# Patient Record
Sex: FEMALE | Race: WHITE | Employment: STUDENT | URBAN - METROPOLITAN AREA
[De-identification: names, ages, dates, MRNs, and addresses within clinical notes are randomized per-mention and may not be internally consistent; named-entity substitution may affect disease eponyms.]

---
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
## 2025-02-08 ENCOUNTER — HOSPITAL ENCOUNTER (OUTPATIENT)
Age: 20
Discharge: HOME | End: 2025-02-08
Payer: COMMERCIAL

## 2025-02-08 ENCOUNTER — APPOINTMENT (OUTPATIENT)
Dept: GENERAL RADIOLOGY | Age: 20
End: 2025-02-08
Attending: NURSE PRACTITIONER
Payer: COMMERCIAL

## 2025-02-08 VITALS
DIASTOLIC BLOOD PRESSURE: 74 MMHG | TEMPERATURE: 99 F | RESPIRATION RATE: 16 BRPM | HEART RATE: 88 BPM | OXYGEN SATURATION: 97 % | SYSTOLIC BLOOD PRESSURE: 110 MMHG

## 2025-02-08 DIAGNOSIS — J06.9 VIRAL UPPER RESPIRATORY TRACT INFECTION: ICD-10-CM

## 2025-02-08 DIAGNOSIS — R05.1 ACUTE COUGH: Primary | ICD-10-CM

## 2025-02-08 PROCEDURE — 71046 X-RAY EXAM CHEST 2 VIEWS: CPT | Performed by: NURSE PRACTITIONER

## 2025-02-08 PROCEDURE — 99214 OFFICE O/P EST MOD 30 MIN: CPT | Performed by: NURSE PRACTITIONER

## 2025-02-08 RX ORDER — METHYLPREDNISOLONE 4 MG/1
TABLET ORAL
Qty: 1 EACH | Refills: 0 | Status: SHIPPED | OUTPATIENT
Start: 2025-02-08

## 2025-02-08 RX ORDER — VALACYCLOVIR HYDROCHLORIDE 1 G/1
1 TABLET, FILM COATED ORAL EVERY 12 HOURS
COMMUNITY
Start: 2025-02-07 | End: 2025-02-14

## 2025-02-08 RX ORDER — ALBUTEROL SULFATE 90 UG/1
2 INHALANT RESPIRATORY (INHALATION) EVERY 4 HOURS PRN
Qty: 1 EACH | Refills: 0 | Status: SHIPPED | OUTPATIENT
Start: 2025-02-08 | End: 2025-03-10

## 2025-02-08 NOTE — ED INITIAL ASSESSMENT (HPI)
Patient here with c/o cough since Tuesday. Also reports had body aches and fever but has not had it again since Thursday.

## 2025-02-08 NOTE — ED PROVIDER NOTES
Patient Seen in: [facility]      History     Chief Complaint   Patient presents with    Cough     Stated Complaint: cough; fever    Subjective:   HPI    19 year old female presents today with her mother with c/o cough, body aches and fever intermittently since Tue. Pt states everything except the cough is gone now but she is having some SOB with the cough.       Objective:     Past Medical History:    Sepsis (HCC)              Past Surgical History:   Procedure Laterality Date    Removal adenoids,primary,<13 y/o      Tonsillectomy  2010                No pertinent social history.            Review of Systems   Constitutional:  Positive for fever.   HENT: Negative.     Eyes: Negative.    Respiratory:  Positive for cough and shortness of breath.    Cardiovascular: Negative.    Gastrointestinal: Negative.    Endocrine: Negative.    Genitourinary: Negative.    Musculoskeletal:  Positive for myalgias.   Skin: Negative.    Allergic/Immunologic: Negative.    Neurological: Negative.    Hematological: Negative.    Psychiatric/Behavioral: Negative.         Positive for stated complaint: cough; fever  Other systems are as noted in HPI.  Constitutional and vital signs reviewed.      All other systems reviewed and negative except as noted above.    Physical Exam     ED Triage Vitals [02/08/25 1247]   /74   Pulse 88   Resp 16   Temp 98.5 °F (36.9 °C)   Temp src Oral   SpO2 97 %   O2 Device None (Room air)       Current Vitals:   Vital Signs  BP: 110/74  Pulse: 88  Resp: 16  Temp: 98.5 °F (36.9 °C)  Temp src: Oral    Oxygen Therapy  SpO2: 97 %  O2 Device: None (Room air)        Physical Exam  Vitals and nursing note reviewed. Exam conducted with a chaperone present.   Constitutional:       Appearance: Normal appearance. She is normal weight.   HENT:      Head: Normocephalic and atraumatic.      Right Ear: External ear normal.      Left Ear: External ear normal.      Nose: Nose normal.      Mouth/Throat:       Mouth: Mucous membranes are moist.      Pharynx: Oropharynx is clear.   Eyes:      Extraocular Movements: Extraocular movements intact.      Conjunctiva/sclera: Conjunctivae normal.      Pupils: Pupils are equal, round, and reactive to light.   Cardiovascular:      Rate and Rhythm: Normal rate and regular rhythm.      Pulses: Normal pulses.      Heart sounds: Normal heart sounds.   Pulmonary:      Effort: Pulmonary effort is normal.      Breath sounds: Normal breath sounds.   Musculoskeletal:      Cervical back: Normal range of motion and neck supple.   Skin:     General: Skin is warm.      Capillary Refill: Capillary refill takes less than 2 seconds.   Neurological:      General: No focal deficit present.      Mental Status: She is alert.   Psychiatric:         Mood and Affect: Mood normal.           ED Course   Labs Reviewed - No data to display                MDM      19 year old female presents today with her mother with c/o cough, body aches and fever intermittently since Tue. Pt states everything except the cough is gone now but she is having some SOB with the cough.   VS: See EMR  Physical exam: See exam  Diff Diag: PNA, bronchitis, viral illness.   XR CHEST PA + LAT CHEST (CPT=71046)    Result Date: 2/8/2025  CONCLUSION:  No acute cardiopulmonary abnormality identified.   LOCATION:  [facility]   Dictated by (CST): Buck Wiley MD on 2/08/2025 at 2:20 PM     Finalized by (CST): Buck Wiley MD on 2/08/2025 at 2:21 PM      Based on physical exam, HPI will diagnosed with upper respiratory tract infection.  Will prescribe a short course of steroid and albuterol.  Patient instructed to replace her toothbrush and increase her water intake.          Medical Decision Making  19 year old female presents today with her mother with c/o cough, body aches and fever intermittently since Tue. Pt states everything except the cough is gone now but she is having some SOB with the cough.     Problems Addressed:  Acute  cough: acute illness or injury  Viral upper respiratory tract infection: acute illness or injury    Amount and/or Complexity of Data Reviewed  Independent Historian: parent  Radiology: ordered. Decision-making details documented in ED Course.    Risk  Prescription drug management.        Disposition and Plan     Clinical Impression:  1. Acute cough    2. Viral upper respiratory tract infection         Disposition:  Discharge  2/8/2025  2:29 pm    Follow-up:  Makeda Hardwick MD  [address] [id number] [phone]      As needed          Medications Prescribed:  Discharge Medication List as of 2/8/2025  2:30 PM        START taking these medications    Details   methylPREDNISolone (MEDROL) 4 MG Oral Tablet Therapy Pack Dosepack: take as directed, Normal, Disp-1 each, R-0      albuterol 108 (90 Base) MCG/ACT Inhalation Aero Soln Inhale 2 puffs into the lungs every 4 (four) hours as needed for Wheezing., Normal, Disp-1 each, R-0                 Supplementary Documentation:

## (undated) NOTE — LETTER
[facility] [place]  [address] [address]  Dept: [phone]  Dept Fax: [phone]         February 17, 2018    Patient: Samantha Kc   YOB: 2005   Date of Visit: 2/17/2018       To Whom It May Concern:

## (undated) NOTE — LETTER
Date & Time: 3/29/2022, 6:01 PM  Patient: Preethi Tanner  Encounter Provider(s):    DARWIN Kebede       To Whom It May Concern:    Talya Kellogg was seen and treated in our department on 3/29/2022.  She should not return to school until Thursday, March 31st.    If you have any questions or concerns, please do not hesitate to call.        _____________________________  Physician/APC Signature